# Patient Record
Sex: FEMALE | Race: WHITE
[De-identification: names, ages, dates, MRNs, and addresses within clinical notes are randomized per-mention and may not be internally consistent; named-entity substitution may affect disease eponyms.]

---

## 2018-01-24 ENCOUNTER — HOSPITAL ENCOUNTER (EMERGENCY)
Dept: HOSPITAL 25 - ED | Age: 73
Discharge: HOME | End: 2018-01-24
Payer: MEDICARE

## 2018-01-24 VITALS — DIASTOLIC BLOOD PRESSURE: 63 MMHG | SYSTOLIC BLOOD PRESSURE: 115 MMHG

## 2018-01-24 DIAGNOSIS — B34.9: Primary | ICD-10-CM

## 2018-01-24 DIAGNOSIS — C25.9: ICD-10-CM

## 2018-01-24 LAB
BASOPHILS # BLD AUTO: 0 10^3/UL (ref 0–0.2)
EOSINOPHIL # BLD AUTO: 0 10^3/UL (ref 0–0.6)
HCT VFR BLD AUTO: 24 % (ref 35–47)
HGB BLD-MCNC: 8.4 G/DL (ref 12–16)
LYMPHOCYTES # BLD AUTO: 0.5 10^3/UL (ref 1–4.8)
MCH RBC QN AUTO: 33 PG (ref 27–31)
MCHC RBC AUTO-ENTMCNC: 35 G/DL (ref 31–36)
MCV RBC AUTO: 95 FL (ref 80–97)
MONOCYTES # BLD AUTO: 0 10^3/UL (ref 0–0.8)
NEUTROPHILS # BLD AUTO: 1.3 10^3/UL (ref 1.5–7.7)
NRBC # BLD AUTO: 0 10^3/UL
NRBC BLD QL AUTO: 0
PLATELET # BLD AUTO: 101 10^3/UL (ref 150–450)
RBC # BLD AUTO: 2.52 10^6/UL (ref 4–5.4)
WBC # BLD AUTO: 1.9 10^3/UL (ref 3.5–10.8)

## 2018-01-24 PROCEDURE — 87086 URINE CULTURE/COLONY COUNT: CPT

## 2018-01-24 PROCEDURE — 87502 INFLUENZA DNA AMP PROBE: CPT

## 2018-01-24 PROCEDURE — 81015 MICROSCOPIC EXAM OF URINE: CPT

## 2018-01-24 PROCEDURE — 99284 EMERGENCY DEPT VISIT MOD MDM: CPT

## 2018-01-24 PROCEDURE — 83605 ASSAY OF LACTIC ACID: CPT

## 2018-01-24 PROCEDURE — 85025 COMPLETE CBC W/AUTO DIFF WBC: CPT

## 2018-01-24 PROCEDURE — 71045 X-RAY EXAM CHEST 1 VIEW: CPT

## 2018-01-24 PROCEDURE — 81003 URINALYSIS AUTO W/O SCOPE: CPT

## 2018-01-24 PROCEDURE — 87040 BLOOD CULTURE FOR BACTERIA: CPT

## 2018-01-24 PROCEDURE — 36415 COLL VENOUS BLD VENIPUNCTURE: CPT

## 2018-01-24 PROCEDURE — 80053 COMPREHEN METABOLIC PANEL: CPT

## 2018-01-24 NOTE — ED
Hawk CAPONE Jason, scribed for Juve Connors MD on 01/24/18 at 1903 .





HPI Febrile Illness





- HPI Summary


HPI Summary: 


This patient is a 72 year old F [place] [facility] [place]  with 

a chief complaint of fever since today. The patient states that her temperature 

arose to 100.9 degrees and since she is on chemotherapy for pancreatic cancer 

she was advised by her Oncologist on-call to go to the ED.  Patient reports 

mild HA, nasal discharge, and sinus congestion. Patient denies vomiting, 

urinary sx, cough, or sick contacts..








- History of Current Complaint


Chief Complaint: EDFever


Time Seen by Provider: 01/24/18 18:29


Hx Obtained From: Patient


Onset/Duration: Still Present


Timing: Constant


Initial Severity: Mild


Current Severity: None


Pain Intensity: 0


Pain Scale Used: 0-10 Numeric


Aggravating Factors: Nothing


Alleviating Factors: Nothing


Associated Signs and Symptoms: Negative, Other: - Patient reports mild HA, 

nasal discharge, and sinus congestion. Patient denies vomiting, and urinary sx.





- Risk Factors


Serious Bacterial Infection Risk Factors: Chemotherapy





- Additional Pertinent History


Primary Care Physician: [id number]





- Allergy/Home Medications


Allergies/Adverse Reactions: 


 Allergies











Allergy/AdvReac Type Severity Reaction Status Date / Time


 


No Known Allergies Allergy   Verified 10/25/17 10:40














PMH/Surg Hx/FS Hx/Imm Hx


Previously Healthy: No


Endocrine/Hematology History: 


   Denies: Hx Diabetes


Cardiovascular History: 


   Denies: Hx Hypertension


GI History: Reports: Other GI Disorders - Pancreatic Cancer


 History: 


   Denies: Hx Dialysis, Hx Renal Disease


Sensory History: Reports: Hx Contacts or Glasses


Opthamlomology History: Reports: Hx Contacts or Glasses





- Cancer History


Cancer Type, Location and Year: pancreatic





- Surgical History


Surgery Procedure, Year, and Place: Right ovary removed


Infectious Disease History: No


Infectious Disease History: 


   Denies: Traveled Outside the [place] in Last 30 Days





- Family History


Known Family History: Positive: Diabetes - II


   Negative: Cardiac Disease





- Social History


Occupation: Works From/At Home


Lives: With Family


Alcohol Use: None


Substance Use Type: Reports: None


Smoking Status (MU): Never Smoked Tobacco





Review of Systems


Positive: Fever - 100.9 today, Other - pancreatic cancer


Positive: Nasal Discharge, Other - sinus congestion


Negative: Vomiting


Positive: no symptoms reported


Positive: Headache - mild


All Other Systems Reviewed And Are Negative: Yes





Physical Exam





- Summary


Physical Exam Summary: 





Appearance: Well-appearing, no distress, Well-nourished


Skin: Warm, color reflects adequate perfusion


Head: Normal Head/Face inspection


Eyes: Conjunctiva clear


ENT: Normal inspection


Neck: Supple, no nodes, no JVD.


Respiratory: Lungs clear, Normal breath sounds, no respiratory distress


Cardio: RRR, No murmur, pulses normal, brisk capillary refill


Abdomen: soft, nontender, no guarding, no rebound


Bowel sounds: present 


Musculoskeletal: Strength Intact/ ROM intact. No calf tenderness. No edema.


Neuro: Alert, muscle tone normal, facial symmetry, speech normal, sensory/motor 

intact 


Psychological: Normal





Triage Information Reviewed: Yes


Vital Signs On Initial Exam: 


 Initial Vitals











Temp Pulse Resp BP Pulse Ox


 


 99 F   99   18   134/74   99 


 


 01/24/18 17:28  01/24/18 17:28  01/24/18 17:28  01/24/18 17:28  01/24/18 17:28











Vital Signs Reviewed: Yes


Appearance: Positive: Well-Appearing


Skin: Positive: Warm, Dry





Diagnostics





- Vital Signs


 Vital Signs











  Temp Pulse Resp BP Pulse Ox


 


 01/24/18 18:30   89   119/57  99


 


 01/24/18 18:00   93   118/59  99


 


 01/24/18 17:47   102    99


 


 01/24/18 17:46     119/56 


 


 01/24/18 17:28  99 F  99  18  134/74  99














- Laboratory


Lab Results: 


 Lab Results











  01/24/18 01/24/18 01/24/18 Range/Units





  19:02 19:02 19:02 


 


WBC  1.9 L    (3.5-10.8)  10^3/ul


 


RBC  2.52 L    (4.0-5.4)  10^6/ul


 


Hgb  8.4 L    (12.0-16.0)  g/dl


 


Hct  24 L    (35-47)  %


 


MCV  95    (80-97)  fL


 


MCH  33 H    (27-31)  pg


 


MCHC  35    (31-36)  g/dl


 


RDW  15    (10.5-15)  %


 


Plt Count  101 L    (150-450)  10^3/ul


 


MPV  8    (7.4-10.4)  um3


 


Neut % (Auto)  67.8    (38-83)  %


 


Lymph % (Auto)  28.1    (25-47)  %


 


Mono % (Auto)  2.6    (1-9)  %


 


Eos % (Auto)  1.1    (0-6)  %


 


Baso % (Auto)  0.4    (0-2)  %


 


Absolute Neuts (auto)  1.3 L    (1.5-7.7)  10^3/ul


 


Absolute Lymphs (auto)  0.5 L    (1.0-4.8)  10^3/ul


 


Absolute Monos (auto)  0    (0-0.8)  10^3/ul


 


Absolute Eos (auto)  0    (0-0.6)  10^3/ul


 


Absolute Basos (auto)  0    (0-0.2)  10^3/ul


 


Absolute Nucleated RBC  0    10^3/ul


 


Nucleated RBC %  0    


 


Sodium   135   (133-145)  mmol/L


 


Potassium   3.9   (3.5-5.0)  mmol/L


 


Chloride   103   (101-111)  mmol/L


 


Carbon Dioxide   26   (22-32)  mmol/L


 


Anion Gap   6   (2-11)  mmol/L


 


BUN   17   (6-24)  mg/dL


 


Creatinine   0.64   (0.51-0.95)  mg/dL


 


Est GFR ( Amer)   118.0   (>60)  


 


Est GFR (Non-Af Amer)   91.7   (>60)  


 


BUN/Creatinine Ratio   26.6 H   (8-20)  


 


Glucose   143 H   ()  mg/dL


 


Lactic Acid    0.5  (0.5-2.0)  mmol/L


 


Calcium   8.3 L   (8.6-10.3)  mg/dL


 


Total Bilirubin   1.00   (0.2-1.0)  mg/dL


 


AST   21   (13-39)  U/L


 


ALT   22   (7-52)  U/L


 


Alkaline Phosphatase   168 H   ()  U/L


 


Total Protein   5.3 L   (6.4-8.9)  g/dL


 


Albumin   3.3   (3.2-5.2)  g/dL


 


Globulin   2.0   (2-4)  g/dL


 


Albumin/Globulin Ratio   1.7   (1-3)  


 


Urine Color     


 


Urine Appearance     


 


Urine pH     (5-9)  


 


Ur Specific Gravity     (1.010-1.030)  


 


Urine Protein     (Negative)  


 


Urine Ketones     (Negative)  


 


Urine Blood     (Negative)  


 


Urine Nitrate     (Negative)  


 


Urine Bilirubin     (Negative)  


 


Urine Urobilinogen     (Negative)  


 


Ur Leukocyte Esterase     (Negative)  


 


Urine WBC (Auto)     (Absent)  


 


Urine RBC (Auto)     (Absent)  


 


Ur Squamous Epith Cells     (Absent)  


 


Urine Bacteria     (Absent)  


 


Urine Glucose     (Negative)  


 


Influenza A (Rapid)     (Negative)  


 


Influenza B (Rapid)     (Negative)  














  01/24/18 01/24/18 Range/Units





  19:18 19:50 


 


WBC    (3.5-10.8)  10^3/ul


 


RBC    (4.0-5.4)  10^6/ul


 


Hgb    (12.0-16.0)  g/dl


 


Hct    (35-47)  %


 


MCV    (80-97)  fL


 


MCH    (27-31)  pg


 


MCHC    (31-36)  g/dl


 


RDW    (10.5-15)  %


 


Plt Count    (150-450)  10^3/ul


 


MPV    (7.4-10.4)  um3


 


Neut % (Auto)    (38-83)  %


 


Lymph % (Auto)    (25-47)  %


 


Mono % (Auto)    (1-9)  %


 


Eos % (Auto)    (0-6)  %


 


Baso % (Auto)    (0-2)  %


 


Absolute Neuts (auto)    (1.5-7.7)  10^3/ul


 


Absolute Lymphs (auto)    (1.0-4.8)  10^3/ul


 


Absolute Monos (auto)    (0-0.8)  10^3/ul


 


Absolute Eos (auto)    (0-0.6)  10^3/ul


 


Absolute Basos (auto)    (0-0.2)  10^3/ul


 


Absolute Nucleated RBC    10^3/ul


 


Nucleated RBC %    


 


Sodium    (133-145)  mmol/L


 


Potassium    (3.5-5.0)  mmol/L


 


Chloride    (101-111)  mmol/L


 


Carbon Dioxide    (22-32)  mmol/L


 


Anion Gap    (2-11)  mmol/L


 


BUN    (6-24)  mg/dL


 


Creatinine    (0.51-0.95)  mg/dL


 


Est GFR ( Amer)    (>60)  


 


Est GFR (Non-Af Amer)    (>60)  


 


BUN/Creatinine Ratio    (8-20)  


 


Glucose    ()  mg/dL


 


Lactic Acid    (0.5-2.0)  mmol/L


 


Calcium    (8.6-10.3)  mg/dL


 


Total Bilirubin    (0.2-1.0)  mg/dL


 


AST    (13-39)  U/L


 


ALT    (7-52)  U/L


 


Alkaline Phosphatase    ()  U/L


 


Total Protein    (6.4-8.9)  g/dL


 


Albumin    (3.2-5.2)  g/dL


 


Globulin    (2-4)  g/dL


 


Albumin/Globulin Ratio    (1-3)  


 


Urine Color   Yellow  


 


Urine Appearance   Clear  


 


Urine pH   7.0  (5-9)  


 


Ur Specific Gravity   1.012  (1.010-1.030)  


 


Urine Protein   Negative  (Negative)  


 


Urine Ketones   Negative  (Negative)  


 


Urine Blood   Negative  (Negative)  


 


Urine Nitrate   Negative  (Negative)  


 


Urine Bilirubin   Negative  (Negative)  


 


Urine Urobilinogen   Negative  (Negative)  


 


Ur Leukocyte Esterase   2+ H  (Negative)  


 


Urine WBC (Auto)   2+(11-20/hpf) H  (Absent)  


 


Urine RBC (Auto)   Trace(0-2/hpf)  (Absent)  


 


Ur Squamous Epith Cells   Present H  (Absent)  


 


Urine Bacteria   Absent  (Absent)  


 


Urine Glucose   Negative  (Negative)  


 


Influenza A (Rapid)  Negative   (Negative)  


 


Influenza B (Rapid)  Negative   (Negative)  











Result Diagrams: 


 01/24/18 19:02





 01/24/18 19:02


Lab Statement: Any lab studies that have been ordered have been reviewed, and 

results considered in the medical decision making process.





- Radiology


  ** CXR


Radiology Interpretation Completed By: Radiologist - CXR reveals, per 

radiologist, NO ACTIVE CARDIOPULMONARY DISEASE IS NOTED. ED physician has 

reviewed this radiology report.





Course/Dx





- Course


Course Of Treatment: Pt afebrile with no antipyretics given. Pt's absolute 

neutrophil count is 1.3. CXR reveals, per radiologist, NO ACTIVE 

CARDIOPULMONARY DISEASE IS NOTED. Influenza A and B test is negative.


Assessment/Plan: We discussed patient care with Dr. Allen (oncologist) and they 

recommended the patient be discharged. Patient will be discharged and follow up 

from Dr. Arellano (oncologist). The patient is agreeable with this plan.





- Febrile Illness


Differential Diagnoses: Bacteremia, Pneumonia, Sepsis, Viremia





- Diagnoses


Provider Diagnoses: 


 Viral illness








- Provider Notifications


Discussed Care Of Patient With: Edgar Allen - Oncologist


Time Discussed With Above Provider: 21:20


Instructed by Provider To: Other - Discharge patient home if she is stable.





Discharge





- Discharge Plan


Condition: Good


Disposition: HOME


Patient Education Materials:  Viral Syndrome (ED)


Referrals: 


No Primary Care Phys,NOPCP [Primary Care Provider] - 


Kenrick Arellano MD [Medical Doctor] - 3 Days





The documentation as recorded by the Hawk mai Jason accurately 

reflects the service I personally performed and the decisions made by me, Juve Connors MD.

## 2018-01-24 NOTE — RAD
Indication: Fever.



Single frontal view of the chest performed at 1909 hours was reviewed.



Comparison is made with previous exam dated December 22, 2016.



No mediastinal shift is noted. Heart is of normal size and configuration. Lung fields

appear clear.



IMPRESSION: NO ACTIVE CARDIOPULMONARY DISEASE IS NOTED.

## 2018-02-25 ENCOUNTER — HOSPITAL ENCOUNTER (INPATIENT)
Dept: HOSPITAL 25 - ED | Age: 73
LOS: 3 days | Discharge: HOME | DRG: 809 | End: 2018-02-28
Attending: INTERNAL MEDICINE | Admitting: HOSPITALIST
Payer: MEDICARE

## 2018-02-25 DIAGNOSIS — C25.9: ICD-10-CM

## 2018-02-25 DIAGNOSIS — Z79.899: ICD-10-CM

## 2018-02-25 DIAGNOSIS — K21.9: ICD-10-CM

## 2018-02-25 DIAGNOSIS — Z80.6: ICD-10-CM

## 2018-02-25 DIAGNOSIS — C79.9: ICD-10-CM

## 2018-02-25 DIAGNOSIS — D70.9: Primary | ICD-10-CM

## 2018-02-25 DIAGNOSIS — R50.81: ICD-10-CM

## 2018-02-25 PROCEDURE — 99239 HOSP IP/OBS DSCHRG MGMT >30: CPT

## 2018-02-25 PROCEDURE — 85610 PROTHROMBIN TIME: CPT

## 2018-02-25 PROCEDURE — 86301 IMMUNOASSAY TUMOR CA 19-9: CPT

## 2018-02-25 PROCEDURE — 85025 COMPLETE CBC W/AUTO DIFF WBC: CPT

## 2018-02-25 PROCEDURE — 71045 X-RAY EXAM CHEST 1 VIEW: CPT

## 2018-02-25 PROCEDURE — 86300 IMMUNOASSAY TUMOR CA 15-3: CPT

## 2018-02-25 PROCEDURE — 81003 URINALYSIS AUTO W/O SCOPE: CPT

## 2018-02-25 PROCEDURE — 87502 INFLUENZA DNA AMP PROBE: CPT

## 2018-02-25 PROCEDURE — 85730 THROMBOPLASTIN TIME PARTIAL: CPT

## 2018-02-25 PROCEDURE — 83605 ASSAY OF LACTIC ACID: CPT

## 2018-02-25 PROCEDURE — 71260 CT THORAX DX C+: CPT

## 2018-02-25 PROCEDURE — 80053 COMPREHEN METABOLIC PANEL: CPT

## 2018-02-25 PROCEDURE — 87086 URINE CULTURE/COLONY COUNT: CPT

## 2018-02-25 PROCEDURE — 80048 BASIC METABOLIC PNL TOTAL CA: CPT

## 2018-02-25 PROCEDURE — 87040 BLOOD CULTURE FOR BACTERIA: CPT

## 2018-02-25 PROCEDURE — 36415 COLL VENOUS BLD VENIPUNCTURE: CPT

## 2018-02-25 PROCEDURE — 74177 CT ABD & PELVIS W/CONTRAST: CPT

## 2018-02-25 PROCEDURE — 99233 SBSQ HOSP IP/OBS HIGH 50: CPT

## 2018-02-26 LAB
BASOPHILS # BLD AUTO: 0 10^3/UL (ref 0–0.2)
BASOPHILS # BLD AUTO: 0 10^3/UL (ref 0–0.2)
EOSINOPHIL # BLD AUTO: 0 10^3/UL (ref 0–0.6)
EOSINOPHIL # BLD AUTO: 0 10^3/UL (ref 0–0.6)
HCT VFR BLD AUTO: 24 % (ref 35–47)
HCT VFR BLD AUTO: 27 % (ref 35–47)
HGB BLD-MCNC: 8.4 G/DL (ref 12–16)
HGB BLD-MCNC: 9.3 G/DL (ref 12–16)
INR PPP/BLD: 1.01 (ref 0.77–1.02)
LYMPHOCYTES # BLD AUTO: 0.7 10^3/UL (ref 1–4.8)
LYMPHOCYTES # BLD AUTO: 1 10^3/UL (ref 1–4.8)
MCH RBC QN AUTO: 32 PG (ref 27–31)
MCH RBC QN AUTO: 33 PG (ref 27–31)
MCHC RBC AUTO-ENTMCNC: 35 G/DL (ref 31–36)
MCHC RBC AUTO-ENTMCNC: 35 G/DL (ref 31–36)
MCV RBC AUTO: 94 FL (ref 80–97)
MCV RBC AUTO: 94 FL (ref 80–97)
MONOCYTES # BLD AUTO: 0.1 10^3/UL (ref 0–0.8)
MONOCYTES # BLD AUTO: 0.1 10^3/UL (ref 0–0.8)
NEUTROPHILS # BLD AUTO: 0.4 10^3/UL (ref 1.5–7.7)
NEUTROPHILS # BLD AUTO: 0.5 10^3/UL (ref 1.5–7.7)
NRBC # BLD AUTO: 0 10^3/UL
NRBC # BLD AUTO: 0 10^3/UL
NRBC BLD QL AUTO: 0
NRBC BLD QL AUTO: 0
PLATELET # BLD AUTO: 57 10^3/UL (ref 150–450)
PLATELET # BLD AUTO: 69 10^3/UL (ref 150–450)
RBC # BLD AUTO: 2.58 10^6/UL (ref 4–5.4)
RBC # BLD AUTO: 2.85 10^6/UL (ref 4–5.4)
WBC # BLD AUTO: 1.4 10^3/UL (ref 3.5–10.8)
WBC # BLD AUTO: 1.6 10^3/UL (ref 3.5–10.8)

## 2018-02-26 RX ADMIN — OMEPRAZOLE SCH MG: 20 CAPSULE, DELAYED RELEASE ORAL at 04:41

## 2018-02-26 RX ADMIN — CEFEPIME HYDROCHLORIDE SCH MLS/HR: 2 INJECTION, SOLUTION INTRAVENOUS at 09:52

## 2018-02-26 RX ADMIN — SODIUM CHLORIDE SCH MLS/HR: 900 IRRIGANT IRRIGATION at 09:53

## 2018-02-26 RX ADMIN — DOCUSATE SODIUM SCH MG: 100 CAPSULE, LIQUID FILLED ORAL at 09:52

## 2018-02-26 RX ADMIN — CEFEPIME HYDROCHLORIDE SCH MLS/HR: 2 INJECTION, SOLUTION INTRAVENOUS at 01:25

## 2018-02-26 RX ADMIN — SODIUM CHLORIDE SCH MLS/HR: 900 IRRIGANT IRRIGATION at 02:07

## 2018-02-26 RX ADMIN — DOCUSATE SODIUM SCH MG: 100 CAPSULE, LIQUID FILLED ORAL at 20:47

## 2018-02-26 RX ADMIN — SODIUM CHLORIDE SCH MLS/HR: 900 IRRIGANT IRRIGATION at 20:47

## 2018-02-26 RX ADMIN — CEFEPIME HYDROCHLORIDE SCH MLS/HR: 2 INJECTION, SOLUTION INTRAVENOUS at 16:22

## 2018-02-26 NOTE — RAD
INDICATION: Fever



COMPARISON: Most recent comparison chest x-ray dated January 24, 2018

 

TECHNIQUE: Single AP portable view of the chest was obtained.



FINDINGS: 



Image quality is compromised due to the relative inferiority of a portable chest x-ray.



There is a left subclavian vein Mediport with the tip terminating at the upper portion of

the superior vena cava.



The heart and mediastinum exhibit normal size and contour.



The lungs are grossly clear. There is no evidence of a large pleural effusion.



Visualized bones are normal for the patient's age.



IMPRESSION:  

1. No radiographic evidence for acute cardiopulmonary abnormality on this portable chest

x-ray. 

2. The left subclavian vein Mediport terminates at the high superior vena cava. Please

correlate to ability to aspirate and inject the Mediport clinically.

## 2018-02-26 NOTE — ED
Jodie CAPONE Edward, scribed for Xena Aly MD on 02/25/18 at 2225 .





HPI Febrile Illness





- HPI Summary


HPI Summary: 





72 y/o female presents to the ED c/o fever at around 16:00 today, high of 101.5 

at around 17:30 this afternoon. Denies nausea. Associated sx: one side of 

nostril "plugged" but pt states she gets blood clots often, sinus pressure. 

PMHx pancreatic CA dx May 2016, currently on chemo. Pt sent to ED by Dr. Arellano. 

Sx stent put in, ovarian removal.





- History of Current Complaint


Chief Complaint: EDFever


Time Seen by Provider: 02/25/18 22:23


Hx Obtained From: Patient


Onset/Duration: Started Hours Ago


Timing: Constant


Pain Intensity: 0


Aggravating Factors: Nothing


Alleviating Factors: Nothing


Associated Signs and Symptoms: Other: - sinus pressure





- Additional Pertinent History


Primary Care Physician: [id number]





- Allergy/Home Medications


Allergies/Adverse Reactions: 


 Allergies











Allergy/AdvReac Type Severity Reaction Status Date / Time


 


No Known Allergies Allergy   Verified 10/25/17 10:40














PMH/Surg Hx/FS Hx/Imm Hx


Previously Healthy: No


Endocrine/Hematology History: 


   Denies: Hx Diabetes


Cardiovascular History: 


   Denies: Hx Hypertension


GI History: Reports: Other GI Disorders - Pancreatic Cancer


 History: 


   Denies: Hx Dialysis, Hx Renal Disease


Sensory History: Reports: Hx Contacts or Glasses


Opthamlomology History: Reports: Hx Contacts or Glasses





- Cancer History


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





- Surgical History


Surgery Procedure, Year, and Place: Right ovary removed


Infectious Disease History: No


Infectious Disease History: 


   Denies: Traveled Outside the [place] in Last 30 Days





- Family History


Known Family History: Positive: Diabetes - II


   Negative: Cardiac Disease





- Social History


Alcohol Use: None


Hx Substance Use: No


Substance Use Type: Reports: None


Hx Tobacco Use: No


Smoking Status (MU): Never Smoked Tobacco





Review of Systems


Positive: Fever


Eyes: Negative


ENT: Other - sinus pressure


Cardiovascular: Negative


Respiratory: Negative


Gastrointestinal: Negative


Genitourinary: Negative


Musculoskeletal: Negative


Skin: Negative


Neurological: Negative


Psychological: Normal


All Other Systems Reviewed And Are Negative: Yes





Physical Exam





- Summary


Physical Exam Summary: 





VITAL SIGNS: Reviewed.


GENERAL:  Patient is a well-developed and nourished female who is lying 

comfortable in the stretcher. Patient is not in any acute respiratory distress.


HEAD AND FACE: No signs of trauma. No ecchymosis, hematomas or skull 

depressions. No sinus tenderness.


EYES: PERRLA, EOMI x 2, No injected conjunctiva, no nystagmus.


EARS: Hearing grossly intact. Ear canals and tympanic membranes are within 

normal limits.


MOUTH: Oropharynx within normal limits.


NECK: Supple, trachea is midline, no adenopathy, no JVD, no carotid bruit, no c-

spine tenderness, neck with full ROM.


CHEST: Symmetric, no tenderness at palpation


LUNGS: Clear to auscultation bilaterally. No wheezing or crackles.


CVS: Tachycardic, S1 and S2 present, no murmurs or gallops appreciated.


ABDOMEN: Soft, non-tender. No signs of distention. No rebound no guarding, and 

no masses palpated. Bowel sounds are normal.


EXTREMITIES: FROM in all major joints, no edema, no cyanosis or clubbing.


NEURO: Alert and oriented x 3. No acute neurological deficits. Speech is normal 

and follows commands.


SKIN: Dry and warm


Triage Information Reviewed: Yes


Vital Signs On Initial Exam: 


 Initial Vitals











Temp Pulse Resp BP Pulse Ox


 


 99.5 F   131   16   115/76   98 


 


 02/25/18 21:17  02/25/18 21:17  02/25/18 21:17  02/25/18 21:17  02/25/18 21:17











Vital Signs Reviewed: Yes





Diagnostics





- Vital Signs


 Vital Signs











  Temp Pulse Resp BP Pulse Ox


 


 02/25/18 22:24  99.3 F    


 


 02/25/18 21:17  99.5 F  131  16  115/76  98














- Laboratory


Result Diagrams: 


 02/25/18 23:42





 02/25/18 23:23


Lab Statement: Any lab studies that have been ordered have been reviewed, and 

results considered in the medical decision making process.





- Radiology


  ** CXR 


Xray Interpretation: No Acute Changes


Radiology Interpretation Completed By: ED Physician





Re-Evaluation





- Re-Evaluation


  ** 1


Re-Evaluation Time: 00:44


Comment: Discuss test results, plan of care





Course/Dx





- Course


Assessment/Plan: WBC 1.4 L, Absolute Neutrophils 0.5 L. INFLUENZA A AND B 

NEGATIVE. CXR negative. Spoke with Dr. Frankenberg who will admit the pt to 

[facility]. Pt agrees with plan.





- Diagnoses


Provider Diagnoses: 


 Neutropenic fever








- Provider Notifications


Discussed Care Of Patient With: Fred Frankenberg


Time Discussed With Above Provider: 00:45


Instructed by Provider To: Admit As Inpatient





Discharge





- Discharge Plan


Condition: Stable


Disposition: ADMITTED TO [facility]


Referrals: 


No Primary Care Phys,NOPCP [Primary Care Provider] - 





The documentation as recorded by the Jodie mai Edward accurately reflects the 

service I personally performed and the decisions made by me, Xena Aly MD.

## 2018-02-26 NOTE — PN
Progress Note





- Progress Note


Date of Service: 02/26/18


SOAP: 


Subjective:


[]She feels fine. No abdominal pain. Fever last night better. Has been 

tolerating therapy but with frequent does delays for low counts. No focal 

symptoms of infection. 











Acetaminophen (Tylenol Tab*)  650 mg PO Q6H PRN


   PRN Reason: FEVER/PAIN


Docusate Sodium (Colace Cap*)  200 mg PO BID [facility]


   Last Admin: 02/26/18 09:52 Dose:  200 mg


Heparin Sodium (Porcine) (Heparin Vial(*))  5,000 units SUBCUT Q8HR [facility]


Cefepime HCl (Maxipime 2 Gm In Dextrose Duplex (*))  2 gm in 50 mls @ 100 mls/

hr IV Q8H [facility]


   Last Admin: 02/26/18 09:52 Dose:  100 mls/hr


Sodium Chloride (Ns 0.9% 1000 Ml*)  1,000 mls @ 125 mls/hr IV PER RATE [facility]


   Last Admin: 02/26/18 09:53 Dose:  125 mls/hr


Melatonin (Melatonin (Nf))  3 mg PO BEDTIME PRN; Protocol


   PRN Reason: Sleep


Omeprazole (Prilosec Cap*)  20 mg PO DAILY@0600 [facility]


   Last Admin: 02/26/18 04:41 Dose:  20 mg


Ondansetron HCl (Zofran Inj*)  4 mg IV Q6H PRN


   PRN Reason: NAUSEA


Tramadol HCl (Ultram*)  50 mg PO Q6H PRN


   PRN Reason: PAIN





Objective:


[]


 Vital Signs











Temp Pulse Resp BP Pulse Ox


 


 98.6 F   103   18   111/64   98 


 


 02/26/18 11:01  02/26/18 11:01  02/26/18 11:01  02/26/18 11:01  02/26/18 11:01








HEENT pale, no moral lesions, no dental disease


CTA


RRR S1S2


+BS, NT, ND and no masses


Ext - Tr edema


no skin lesions





CXR and UA negative


Flu negative


BC not reported yet








Assessment:


[]73 year old on second line therapy with Abraxane/Gemcitabine for metastatic 

pancreatic cancer. She has had frequent dose delays for cytopenias and no NF. 

Had been due today.








Plan:


[]1. NF. Continue Cefepime and follow counts. Neupogen if not improving 

tomorrow.


2. Re-check CT scan and CA 27-29. If cancer stable or responding will continue 

chemotherapy on once every other week.

## 2018-02-26 NOTE — HP
H&P (Free Text)


History and Physical: 





PCP: BELEM Arellano MD





Date/Time: 2018 0045





CC: fever





HPI: Mrs Lennox is a 74YO female HX of pancreatic CA on treatment with 

gemcitabine & abraxane last give 2018. She awoke  AM with 

increased fatigue which persisted throughout the day, developing a fever of 

101F around 1600 for which she called her oncologist who advised evaluation in 

the ED. She has some mild head congestion, but denies exacerbating or 

alleviating factors, chills, sweats, N/V, diarrhea, cough, chest congestion, 

headache, sore throat, earache, abdominal pain, B/U/F of urine, flank pain, 

chest pain, rash, open wound, or other issues. Her  who is a physician's 

assistant heard a slight wheeze in her lung this AM which had resolved by 

afternoon. ED evaluation reveals neutropenia with an ANC of 0.5. She was given 

IV vancomycin in the ED and was ordered piperacillin/tazobactam which I D/C'd 

in preference of cefepime 2gm IV Q8H. She will be pan-cultured and placed on 

neutropenic precautions.





PMedHx


pancreatic CA on treatment with gemcitabine & abraxane last give 2018


GERD





Ambulatory Orders





Metoclopramide TAB* [Reglan TAB*] 10 mg PO Q6H PRN 16 


Omeprazole CAP* [Prilosec CAP* 20 MG] 20 mg PO DAILY 16 


Levofloxacin TAB* [Levaquin TAB*] 750 mg PO DAILY #10 tab 16 


Loperamide CAP* [Imodium CAP*] 2 mg PO .SEE DIRECTIONS PRN #0 cap 16 


Potassium Chlor TAB* [Potassium Chlor TAB 20 MEQ*] 40 meq PO DAILY #60 tab.er  


Magnesium Oxide TAB* [MagOx 400 TAB*] 400 mg PO BID 18 





Allergies


No Known Allergies Allergy (Verified 10/25/17 10:40)





PSurgHx


R oophorectomy


port placement


biliary stent placement





SocHx: no tobacco, alcohol, or recreational drugs; volunteer with [employer]; , lives with her ; full code status





FamHx: Mother:  age 88 2nd leukemia; Father passed at 75 of unknown 

cause.





ROS: as above, otherwise reviewed and all were negative





vitals: 


 Vital Signs











Temp  36.8 C   18 02:24


 


Pulse  92   18 02:24


 


Resp  16   18 02:26


 


BP  121/78   18 02:24


 


Pulse Ox  100   18 02:24








 Intake & Output











 18





 11:59 23:59 11:59


 


Intake Total  1000 0


 


Balance  1000 0


 


Weight  62.142 kg 61.87 kg


 


Intake:   


 


  IV Fluids  1000 


 


  Oral   0








Constitutional: NAD, normally developed, well-nourished elderly white female


HEENM: atraumatic; sclera/conjunctiva: anicteric/clear; hearing: clinically 

intact; oropharynx: clear, mucosa moist


Neck: soft tissue: no nuchal rigidity; thyroid: normal


Pulmonary: clear to auscultation bilaterally, good aeration, no accessory 

muscle use 


CV: RR/RR, normal S1S2, no carotid bruit, no jugular venous distention, 2+ B DP/

PT, no edema


Abdominal: soft, non-distended, non-tender, no rebound/guarding/rigidity, 

normoactive bowel sounds, no hepatosplenomegaly or masses, no costovertebral 

angle tenderness


Musculoskeletal: general: grossly intact, no tenderness to palpation


Integumental: normal appearance and texture of exposed skin





Psychiatric 


orientation: AA&O to PPS


affect: calm


mood: cooperative/pleasant


eye contact: good


content: reliable


responses: timely


insight: good





Testing: 


 Lab Results











  18 Range/Units





  23:23 23:23 23:42 


 


WBC    1.4 L  (3.5-10.8)  10^3/ul


 


RBC    2.85 L  (4.0-5.4)  10^6/ul


 


Hgb    9.3 L  (12.0-16.0)  g/dl


 


Hct    27 L  (35-47)  %


 


MCV    94  (80-97)  fL


 


MCH    33 H  (27-31)  pg


 


MCHC    35  (31-36)  g/dl


 


RDW    14  (10.5-15)  %


 


Plt Count    69 L  (150-450)  10^3/ul


 


MPV    8  (7.4-10.4)  um3


 


Neut % (Auto)    36.2 L  (38-83)  %


 


Lymph % (Auto)    54.9 H  (25-47)  %


 


Mono % (Auto)    6.3  (0-7)  %


 


Eos % (Auto)    1.3  (0-6)  %


 


Baso % (Auto)    1.3  (0-2)  %


 


Absolute Neuts (auto)    0.5 L*  (1.5-7.7)  10^3/ul


 


Absolute Lymphs (auto)    0.7 L  (1.0-4.8)  10^3/ul


 


Absolute Monos (auto)    0.1  (0-0.8)  10^3/ul


 


Absolute Eos (auto)    0  (0-0.6)  10^3/ul


 


Absolute Basos (auto)    0  (0-0.2)  10^3/ul


 


Absolute Nucleated RBC    0  10^3/ul


 


Nucleated RBC %    0  


 


INR (Anticoag Therapy)  1.01    (0.77-1.02)  


 


APTT  36.2    (26.0-36.3)  seconds


 


Sodium   130 L   (133-145)  mmol/L


 


Potassium   3.9   (3.5-5.0)  mmol/L


 


Chloride   99 L   (101-111)  mmol/L


 


Carbon Dioxide   25   (22-32)  mmol/L


 


Anion Gap   6   (2-11)  mmol/L


 


BUN   26 H   (6-24)  mg/dL


 


Creatinine   0.83   (0.51-0.95)  mg/dL


 


Est GFR ( Amer)   87.2   (>60)  


 


Est GFR (Non-Af Amer)   67.8   (>60)  


 


BUN/Creatinine Ratio   31.3 H   (8-20)  


 


Glucose   192 H   ()  mg/dL


 


Lactic Acid     (0.5-2.0)  mmol/L


 


Calcium   9.3   (8.6-10.3)  mg/dL


 


Total Bilirubin   0.90   (0.2-1.0)  mg/dL


 


AST   21   (13-39)  U/L


 


ALT   24   (7-52)  U/L


 


Alkaline Phosphatase   185 H   ()  U/L


 


Total Protein   6.1 L   (6.4-8.9)  g/dL


 


Albumin   3.7   (3.2-5.2)  g/dL


 


Globulin   2.4   (2-4)  g/dL


 


Albumin/Globulin Ratio   1.5   (1-3)  


 


Urine Color     


 


Urine Appearance     


 


Urine pH     (5-9)  


 


Ur Specific Gravity     (1.010-1.030)  


 


Urine Protein     (Negative)  


 


Urine Ketones     (Negative)  


 


Urine Blood     (Negative)  


 


Urine Nitrate     (Negative)  


 


Urine Bilirubin     (Negative)  


 


Urine Urobilinogen     (Negative)  


 


Ur Leukocyte Esterase     (Negative)  


 


Urine Glucose     (Negative)  


 


Urine Ascorbic Acid     (Negative)  


 


Influenza A (Rapid)     (Negative)  


 


Influenza B (Rapid)     (Negative)  














  18 Range/Units





  23:42 23:53 00:43 


 


WBC     (3.5-10.8)  10^3/ul


 


RBC     (4.0-5.4)  10^6/ul


 


Hgb     (12.0-16.0)  g/dl


 


Hct     (35-47)  %


 


MCV     (80-97)  fL


 


MCH     (27-31)  pg


 


MCHC     (31-36)  g/dl


 


RDW     (10.5-15)  %


 


Plt Count     (150-450)  10^3/ul


 


MPV     (7.4-10.4)  um3


 


Neut % (Auto)     (38-83)  %


 


Lymph % (Auto)     (25-47)  %


 


Mono % (Auto)     (0-7)  %


 


Eos % (Auto)     (0-6)  %


 


Baso % (Auto)     (0-2)  %


 


Absolute Neuts (auto)     (1.5-7.7)  10^3/ul


 


Absolute Lymphs (auto)     (1.0-4.8)  10^3/ul


 


Absolute Monos (auto)     (0-0.8)  10^3/ul


 


Absolute Eos (auto)     (0-0.6)  10^3/ul


 


Absolute Basos (auto)     (0-0.2)  10^3/ul


 


Absolute Nucleated RBC     10^3/ul


 


Nucleated RBC %     


 


INR (Anticoag Therapy)     (0.77-1.02)  


 


APTT     (26.0-36.3)  seconds


 


Sodium     (133-145)  mmol/L


 


Potassium     (3.5-5.0)  mmol/L


 


Chloride     (101-111)  mmol/L


 


Carbon Dioxide     (22-32)  mmol/L


 


Anion Gap     (2-11)  mmol/L


 


BUN     (6-24)  mg/dL


 


Creatinine     (0.51-0.95)  mg/dL


 


Est GFR ( Amer)     (>60)  


 


Est GFR (Non-Af Amer)     (>60)  


 


BUN/Creatinine Ratio     (8-20)  


 


Glucose     ()  mg/dL


 


Lactic Acid  0.8    (0.5-2.0)  mmol/L


 


Calcium     (8.6-10.3)  mg/dL


 


Total Bilirubin     (0.2-1.0)  mg/dL


 


AST     (13-39)  U/L


 


ALT     (7-52)  U/L


 


Alkaline Phosphatase     ()  U/L


 


Total Protein     (6.4-8.9)  g/dL


 


Albumin     (3.2-5.2)  g/dL


 


Globulin     (2-4)  g/dL


 


Albumin/Globulin Ratio     (1-3)  


 


Urine Color    Yellow  


 


Urine Appearance    Clear  


 


Urine pH    5.0  (5-9)  


 


Ur Specific Gravity    1.020  (1.010-1.030)  


 


Urine Protein    Negative  (Negative)  


 


Urine Ketones    Negative  (Negative)  


 


Urine Blood    Negative  (Negative)  


 


Urine Nitrate    Negative  (Negative)  


 


Urine Bilirubin    Negative  (Negative)  


 


Urine Urobilinogen    Negative  (Negative)  


 


Ur Leukocyte Esterase    Negative  (Negative)  


 


Urine Glucose    Negative  (Negative)  


 


Urine Ascorbic Acid    * H  (Negative)  


 


Influenza A (Rapid)   Negative   (Negative)  


 


Influenza B (Rapid)   Negative   (Negative)  








CXR, personally reviewed: no acute process, port L chest





Impression: 73F HX pancreatic CA  on treatment with gemcitabine & abraxane last 

give 2018 presents with neutropenic fever





DIAGNOSIS & PLAN


Primary 


neutropenic fever


: blood, sputum, & urine CXs


: IVFs


: cefepime 2gm IV Q8H


: neutropenic precautions


: supportive care





Secondary 


pancreatic CA


: continue management per oncology





Admission Rational: inpatient for neutropenic fever at very high risk of 

morbidity/mortality; inappropriate for outpatient setting


DVTp: SCDs & heparin SQ


Code Status: full


HCP:

## 2018-02-26 NOTE — RAD
INDICATION: Pancreatic and ovarian cancer. Neutropenic fever.



COMPARISON: December 06, 2017 PET/CT. February 25, 2018 chest radiograph. October 25, 2017

CT.



TECHNIQUE: Multidetector CT images were obtained from the lung apices to the ischial

tuberosities with 83 mL Omnipaque 300 IV contrast.  Oral contrast administered.



CHEST REPORT: Minimal LEFT greater than RIGHT basilar atelectasis. No alveolar

consolidation concerning for pneumonia or suspicious focal pulmonary lesions. Negative for

pleural effusions.



Negative for thoracic lymphadenopathy. Tip of LEFT chest port at level of superior vena

cava. Negative for cardiomegaly. Physiologic small volume of pericardial fluid. Normal

diameter thoracic aorta.



Mild anterior compression deformity of the T10 vertebral body is chronic. Multiple osseous

hemangiomas of the thoracic spine noted. No suspicious osseous lesions of the thorax

evident.



CHEST IMPRESSION: 

1. No pulmonary inflammatory process evident.

2. No evidence for thoracic metastatic disease.



ABDOMEN PELVIS REPORT: 2.5 cm AP by 3.9 cm transverse hypodense mass at the pancreatic

body increased from 2.0 x 3.8 cm previously. Associated advanced atrophy of the pancreatic

tail. Metallic biliary stent in place with associated pneumobilia. No conspicuous focal

liver lesions evident. Mildly increased upper normal size spleen. Chronic finding

portosystemic shunts extending to the LEFT renal vein. Normal opacification of the

superior mesenteric, splenic, and portal veins.



No CT abnormality of the upper GI, small bowel, appendix visualized medial to the cecum,

or colon evident. Negative for ascites, free air, hernias.



Normal adrenal glands. Symmetric nephrograms and pyelograms. No suspicious renal lesions

or hydronephrosis. Unremarkable nondilated ureters and partially distended urinary

bladder. Partially calcified fibroid at the LEFT uterine fundus. Unremarkable adnexal

regions.



Normal diameter abdominal aorta and iliac arteries with mild atherosclerotic plaque.

Physiologic partial distention of the IVC.



Negative for suspicious osseous lesions.



ABDOMEN PELVIS IMPRESSION: 

1. Mild interval enlargement of hypodense mass at the pancreatic body. Mild increase in

intrahepatic biliary dilatation. Metallic biliary stent remains in place.

2. Chronic finding portosystemic shunts extending to the LEFT renal vein. Normal

opacification of the superior mesenteric, splenic, and portal veins.

3. Negative for ascites.

4. Negative for lymphadenopathy.

## 2018-02-27 LAB
BASOPHILS # BLD AUTO: 0 10^3/UL (ref 0–0.2)
EOSINOPHIL # BLD AUTO: 0.1 10^3/UL (ref 0–0.6)
HCT VFR BLD AUTO: 21 % (ref 35–47)
HGB BLD-MCNC: 7.5 G/DL (ref 12–16)
LYMPHOCYTES # BLD AUTO: 0.9 10^3/UL (ref 1–4.8)
MCH RBC QN AUTO: 33 PG (ref 27–31)
MCHC RBC AUTO-ENTMCNC: 36 G/DL (ref 31–36)
MCV RBC AUTO: 93 FL (ref 80–97)
MONOCYTES # BLD AUTO: 0.2 10^3/UL (ref 0–0.8)
NEUTROPHILS # BLD AUTO: 0.4 10^3/UL (ref 1.5–7.7)
NRBC # BLD AUTO: 0 10^3/UL
NRBC BLD QL AUTO: 0.2
PLATELET # BLD AUTO: 50 10^3/UL (ref 150–450)
RBC # BLD AUTO: 2.25 10^6/UL (ref 4–5.4)
WBC # BLD AUTO: 1.5 10^3/UL (ref 3.5–10.8)

## 2018-02-27 RX ADMIN — CEFEPIME HYDROCHLORIDE SCH MLS/HR: 2 INJECTION, SOLUTION INTRAVENOUS at 01:08

## 2018-02-27 RX ADMIN — SPIRONOLACTONE SCH MG: 25 TABLET ORAL at 11:37

## 2018-02-27 RX ADMIN — SPIRONOLACTONE SCH MG: 25 TABLET ORAL at 20:01

## 2018-02-27 RX ADMIN — OMEPRAZOLE SCH MG: 20 CAPSULE, DELAYED RELEASE ORAL at 05:48

## 2018-02-27 RX ADMIN — DOCUSATE SODIUM SCH: 100 CAPSULE, LIQUID FILLED ORAL at 10:28

## 2018-02-27 RX ADMIN — CEFEPIME HYDROCHLORIDE SCH MLS/HR: 2 INJECTION, POWDER, FOR SOLUTION INTRAVENOUS at 16:05

## 2018-02-27 RX ADMIN — CEFEPIME HYDROCHLORIDE SCH MLS/HR: 2 INJECTION, SOLUTION INTRAVENOUS at 09:22

## 2018-02-28 VITALS — SYSTOLIC BLOOD PRESSURE: 106 MMHG | DIASTOLIC BLOOD PRESSURE: 54 MMHG

## 2018-02-28 LAB
BASOPHILS # BLD AUTO: 0 10^3/UL (ref 0–0.2)
EOSINOPHIL # BLD AUTO: 0.2 10^3/UL (ref 0–0.6)
HCT VFR BLD AUTO: 23 % (ref 35–47)
HGB BLD-MCNC: 7.9 G/DL (ref 12–16)
LYMPHOCYTES # BLD AUTO: 1.2 10^3/UL (ref 1–4.8)
MCH RBC QN AUTO: 33 PG (ref 27–31)
MCHC RBC AUTO-ENTMCNC: 35 G/DL (ref 31–36)
MCV RBC AUTO: 94 FL (ref 80–97)
MONOCYTES # BLD AUTO: 0.4 10^3/UL (ref 0–0.8)
NEUTROPHILS # BLD AUTO: 2 10^3/UL (ref 1.5–7.7)
NRBC # BLD AUTO: 0 10^3/UL
NRBC BLD QL AUTO: 0.1
PLATELET # BLD AUTO: 69 10^3/UL (ref 150–450)
RBC # BLD AUTO: 2.43 10^6/UL (ref 4–5.4)
WBC # BLD AUTO: 3.7 10^3/UL (ref 3.5–10.8)

## 2018-02-28 RX ADMIN — CEFEPIME HYDROCHLORIDE SCH: 2 INJECTION, POWDER, FOR SOLUTION INTRAVENOUS at 08:53

## 2018-02-28 RX ADMIN — SPIRONOLACTONE SCH: 25 TABLET ORAL at 09:15

## 2018-02-28 RX ADMIN — OMEPRAZOLE SCH MG: 20 CAPSULE, DELAYED RELEASE ORAL at 06:14

## 2018-02-28 RX ADMIN — CEFEPIME HYDROCHLORIDE SCH MLS/HR: 2 INJECTION, POWDER, FOR SOLUTION INTRAVENOUS at 00:33

## 2018-02-28 NOTE — DS
- Discharge Summary





ADMIT DATE: 2/25/18


DISCHARGE DATE: 2/28/18





DISCHARGE DIAGNOSIS:


1. neutropenic fever


2. metastatic pancreatic cancer





DISCHARGE MEDICATIONS:


 Home Medications











 Medication  Instructions  Recorded  Confirmed  Type


 


Metoclopramide TAB* [Reglan TAB*] 10 mg PO Q6H PRN 12/18/16 02/26/18 History


 


Omeprazole CAP* [Prilosec CAP* 20 20 mg PO DAILY 12/18/16 02/26/18 History





MG]    


 


Loperamide CAP* [Imodium CAP*] 2 mg PO .SEE DIRECTIONS PRN #0 cap 12/22/16 02/26 /18 Rx


 


Magnesium Oxide TAB* [MagOx 400 400 mg PO BID 02/26/18 02/26/18 History





TAB*]    


 


Spironolactone TAB* [Aldactone TAB 25 mg PO BID #60 tab 02/28/18  Rx





25 MG*]    








DISCHARGE FOLLOW UP:


WE WILL CALL YOU WITH APPOINTMENT





HOSPITAL COURSE:


Please see full admit H+P, briefly 72 yo F w metastatic pancreatic cancer on 

palliative gemcitabine/abraxane, with frequent delays for counts, presenting 

with neutropenic fevers.  She was treated with vanco/cefepime, and all cultures 

are negative to date.  She is no longer neutropenic with the addition of 

neupogen and will get one more dose today prior to discharge.  She will likely 

have chemotherapy on Monday but we will call her to confirm.  Her CT scan 

showed essentially stable disease and her CA 19-9 is pending.





>30 mins spent, >50% in face to face counseling

## 2018-03-22 ENCOUNTER — HOSPITAL ENCOUNTER (OUTPATIENT)
Dept: HOSPITAL 25 - OR | Age: 73
Discharge: HOME | End: 2018-03-22
Attending: SURGERY
Payer: MEDICARE

## 2018-03-22 VITALS — SYSTOLIC BLOOD PRESSURE: 115 MMHG | DIASTOLIC BLOOD PRESSURE: 71 MMHG

## 2018-03-22 DIAGNOSIS — C25.0: Primary | ICD-10-CM

## 2018-03-22 DIAGNOSIS — D20.1: ICD-10-CM

## 2018-03-22 DIAGNOSIS — Z79.899: ICD-10-CM

## 2018-03-22 PROCEDURE — 88305 TISSUE EXAM BY PATHOLOGIST: CPT

## 2018-03-22 PROCEDURE — 88307 TISSUE EXAM BY PATHOLOGIST: CPT

## 2018-03-22 PROCEDURE — 88331 PATH CONSLTJ SURG 1 BLK 1SPC: CPT

## 2018-03-23 ENCOUNTER — HOSPITAL ENCOUNTER (INPATIENT)
Dept: HOSPITAL 25 - ED | Age: 73
LOS: 3 days | Discharge: HOME | DRG: 908 | End: 2018-03-26
Attending: SURGERY | Admitting: SURGERY
Payer: MEDICARE

## 2018-03-23 DIAGNOSIS — Z79.899: ICD-10-CM

## 2018-03-23 DIAGNOSIS — C25.9: ICD-10-CM

## 2018-03-23 DIAGNOSIS — N99.72: Primary | ICD-10-CM

## 2018-03-23 DIAGNOSIS — Z83.3: ICD-10-CM

## 2018-03-23 LAB
BASOPHILS # BLD AUTO: 0.1 10^3/UL (ref 0–0.2)
EOSINOPHIL # BLD AUTO: 0 10^3/UL (ref 0–0.6)
HCT VFR BLD AUTO: 29 % (ref 35–47)
HGB BLD-MCNC: 9.6 G/DL (ref 12–16)
LYMPHOCYTES # BLD AUTO: 1.4 10^3/UL (ref 1–4.8)
MCH RBC QN AUTO: 31 PG (ref 27–31)
MCHC RBC AUTO-ENTMCNC: 34 G/DL (ref 31–36)
MCV RBC AUTO: 92 FL (ref 80–97)
MONOCYTES # BLD AUTO: 1 10^3/UL (ref 0–0.8)
NEUTROPHILS # BLD AUTO: 23.1 10^3/UL (ref 1.5–7.7)
NRBC # BLD AUTO: 0 10^3/UL
NRBC BLD QL AUTO: 0.1
PLATELET # BLD AUTO: 310 10^3/UL (ref 150–450)
RBC # BLD AUTO: 3.1 10^6/UL (ref 4–5.4)
WBC # BLD AUTO: 25.6 10^3/UL (ref 3.5–10.8)

## 2018-03-23 PROCEDURE — 83690 ASSAY OF LIPASE: CPT

## 2018-03-23 PROCEDURE — 80053 COMPREHEN METABOLIC PANEL: CPT

## 2018-03-23 PROCEDURE — G0378 HOSPITAL OBSERVATION PER HR: HCPCS

## 2018-03-23 PROCEDURE — 83605 ASSAY OF LACTIC ACID: CPT

## 2018-03-23 PROCEDURE — 36415 COLL VENOUS BLD VENIPUNCTURE: CPT

## 2018-03-23 PROCEDURE — 87040 BLOOD CULTURE FOR BACTERIA: CPT

## 2018-03-23 PROCEDURE — 80048 BASIC METABOLIC PNL TOTAL CA: CPT

## 2018-03-23 PROCEDURE — 0TQB4ZZ REPAIR BLADDER, PERCUTANEOUS ENDOSCOPIC APPROACH: ICD-10-PCS | Performed by: SURGERY

## 2018-03-23 PROCEDURE — 85025 COMPLETE CBC W/AUTO DIFF WBC: CPT

## 2018-03-23 PROCEDURE — 85027 COMPLETE CBC AUTOMATED: CPT

## 2018-03-23 PROCEDURE — 88305 TISSUE EXAM BY PATHOLOGIST: CPT

## 2018-03-23 PROCEDURE — 99284 EMERGENCY DEPT VISIT MOD MDM: CPT

## 2018-03-23 PROCEDURE — 84484 ASSAY OF TROPONIN QUANT: CPT

## 2018-03-23 PROCEDURE — 88307 TISSUE EXAM BY PATHOLOGIST: CPT

## 2018-03-23 PROCEDURE — 88331 PATH CONSLTJ SURG 1 BLK 1SPC: CPT

## 2018-03-23 NOTE — XMS REPORT
Regina Lennox

 Created on:2018



Patient:Lennox, Regina

Sex:Female

:1945

External Reference #:[id number]





Demographics







 Address  [address] [place] [address]

 

 Home Phone  [phone]

 

 Mobile Phone  [phone]

 

 Work Phone  [phone]

 

 Email Address  [email or web address]

 

 Preferred Language  English

 

 Marital Status  Not  Or 

 

 Shinto Affiliation  Unknown

 

 Race  White

 

 Ethnic Group  Not  Or 









Author







 Organization  [employer]

 

 Address  [address] [address]

 

 Phone  [phone]









Care Team Providers







 Name  Role  Phone

 

 Kenrick Arellano MD  Primary Care Physician  Unavailable









Payers







 Type  Date  Identification Numbers  Payment Provider  Subscriber

 

 Medicare Primary    Policy Number: [id number]  Medicare  Goldie Lennox









 PayID: [id number]  [address]









 [place] [address]









 [facility]    Policy Number: [id number]  [employer]  Regina Lennox









 PayID: [id number]  [address]









 [place] [address]







Problems







 Description

 

 No Information







Social History







 Type  Date  Description  Comments

 

 Smoking    Patient has never smoked  







Allergies, Adverse Reactions, Alerts







 Date  Description  Reaction  Status  Severity  Comments

 

 2018  NKDA    active    







Medications







 Medication  Date  Status  Form  Strength  Qnty  SIG  Indications  Ordering



                 Provider

 

 Aldactone    Active  Tablets  25mg    1 by mouth    Unknown



   /0000          every day as    



             needed    

 

 Bisacodyl Ec    Active  Tablets  5mg    take one    Unknown



   /0000    DR      tablet by    



             mouth once    



             daily for    



             constipation    



             as needed    

 

 Colace    Active  Capsules  100mg    1 tab by    Unknown



   /0000          mouth 2-3    



             times a day    



             as needed    

 

 Senna    Active  Tablets  8.6mg    2 tabs by    Unknown



   /0000          mouth 1-2    



             times daily    



             as needed    

 

 Magnesium    Active  Tablets  400mg    1 by mouth    Unknown



   /0000          twice a  day    

 

 Multivitamin    Active  Tablets      1 by mouth    Unknown



 Adult  /0000          every day    

 

 Miralax    Active  Packet  3350NF    once a day    Unknown



   /0000          in in the    



             morning as    



             needed for    



             constipation    

 

 Omeprazole    Active  Capsules  20mg    1 by mouth    Unknown



   /0000    DR      every day as    



             needed    

 

 Hydromorphone HCL    Active  Tablets  4mg    take   1/2    Unknown



   /0000          To 1 Tablet    



             by mouth    



             every 4    



             hours if    



             needed    

 

 Spironolactone/Hy    Active  Tablets  25-25mg    1 by mouth    Unknown



 drochlorothiazide  /0000          every day    

 

 Tylenol    Active  Capsules  325mg    2 tablets    Unknown



   /0000          every 4    



             hours as    



             needed for    



             pain    

 

                 

 

 Imodium A-D    Hx  Capsules  2mg    as directed    Unknown



   /0000              

 

 Ondansetron    Hx  Tablets  4mg    dissolve one    Unknown



   /0000    Dispers      tablet    



             orally every    



             6 hours as    



             needed for    



             nausea.    

 

 Prochlorperazine    Hx  Tablets  5mg    1 by mouth    Unknown



 Maleate  /0000          every 8 h as    



             needed    



             nausea    







Vital Signs







 Date  Vital  Result  Comment

 

 2018  Height  62.5 inches  5'2.50"









 Weight  138.00 lb  

 

 Heart Rate  80 /min  

 

 BP Systolic  102 mmHg  

 

 BP Diastolic  64 mmHg  

 

 Respiratory Rate  16 /min  

 

 Body Temperature  97.5 F  

 

 BMI (Body Mass Index)  24.8 kg/m2  







Results







 Description

 

 No Information







Procedures







 Date  CPT Code  Description  Status

 

 10/20/2009  35691  Biopsy Cervix, Single Or Multiple, Or Local Excision Of  
Completed



     Lesion  

 

 2009  54523  EKG Tracing &amp; Interpretation  Completed







Encounters







 Type  Date  Location  Provider  CPT E/M  Dx

 

 [facility] [place]  [address]  D70.9



   9:07a  Assleticia hurst M.D.    









 R50.81

 

 C25.9









 Office Visit  2016  7:37a  [facility] [place],  
[address]  J18.1



     Brandy CAMPA    









 D61.810

 

 C25.9









 Office Visit  2009  3:00p  DO Not Use  [place],  [address]  V72.31



     Deisy-Nilesh CAMPA    







Plan of Care

Future Appointment(s):2018  2:45 pm - TIFFANY Nicholas at [facility]2018  3:45 pm - Darius Chavez M.D. at [facility]2018 - Darius Chavez M.D.C25.0 Malignant neoplasm of 
head of pancreas

## 2018-03-23 NOTE — OP
CC:  Dr. Darius Chavez; Dr. Kenrick Arellano

 

OPERATIVE REPORT:

 

DATE OF OPERATION:  03/22/18

 

DATE OF BIRTH:  [birth year]

 

SURGEON:  Darius Chavez MD

 

ASSISTANT:  Ying Rivers NP

 

ANESTHESIOLOGIST:  Darius Osorio MD

 

ANESTHESIA:  General anesthetic, local infiltration.

 

PRE-OP DIAGNOSIS:  Pancreatic carcinoma.

 

POST-OP DIAGNOSIS:  Pancreatic carcinoma.

 

OPERATIVE PROCEDURE:  Laparoscopy with biopsies.

 

DESCRIPTION OF PROCEDURE:  The patient was supine on the operating room table. After adequate general
 anesthetic, compression stockings, Rebekah Hugger warmer, and intravenous antibiotics, the abdomen was 
prepped with antiseptic, draped in a sterile fashion.  Local infiltrative anesthesia was administered
 and a small umbilical incision was created.  Blunt port cannula was placed.  Insufflation was kenia
d out with carbon dioxide.  Additional cannulae, 5-mm left lower quadrant and left mid abdomen, were 
placed through small stab wounds under direct vision. Inspection of the peritoneal cavity revealed in
numerable white patches that appeared thin and fibrotic.  There were also some white patches on the o
mentum and on the small bowel mesentery.  The bowel was run from beginning to end and there were no i
mplants on the bowel wall, no obvious liver metastasis.  No obvious metastases in the areas of the co
chintan that were visible.  An area of these white patches in the right lower abdominal peritoneum was bi
opsied and then a portion of omentum was biopsied as well.  These were sent to Pathology and neither 
of them showed any evidence of tumor.  So, it was felt that additional biopsy would be taken from the
 pancreas.  The gastrocolic omentum was entered and the lesser sac entered and the pancreatic mass wa
s evident and biopsies were taken from this site using small biopsy forceps.  These were sent fresh t
o Pathology and they confirmed adequate biopsy of the pancreatic tissue.  The site was suctioned.  Th
ere was no hemorrhage.  Cannulae were removed and the umbilical fascia was closed with 0 Vicryl follo
wed by 5-0 Vicryl for the skin, followed by Steri-Strips.  She tolerated the procedure well, was awak
e, and brought to Recovery in good condition. There were no complications, no drains.  Pathologic spe
cimens as above.  Sponge, instrument counts were correct.  Estimated blood loss 20 mL.

 

 [id number]/[facility] #: [id number]

## 2018-03-23 NOTE — ED
Ki CAPONE Tiffany, scribed for Gene Holman on 03/23/18 at 1748 .





Abdominal Pain/Female





- HPI Summary


HPI Summary: 


The patient is a 72 y/o F BIBA to [facility] with a chief complaint of abdominal 

pain since 03:00 today. The patient rates the pain 5/10 in severity. Symptoms 

aggravated by nothing. Symptoms alleviated by prescribed pain medication. 

Reports right shoulder pain, constipation and inability to urinate. Denies 

nausea, vomiting and fever. Diagnosed with pancreatic cancer in May 2016. Is 

currently on chemotherapy, no radiation. Recently had chemotherapy on 03/12/18. 

Had second biopsy yesterday for genetic analysis and was discharged home at 18:

00 yesterday. Woke up with abdominal pain at 03:00 this morning that was 

relieved with Dilaudid. At 15:00 today, abdominal pain returned so patient 

spoke with Dr. Arellano's (oncology) nurse who approved more pain medication and 

recommended going to the ED.





- History of Current Complaint


Chief Complaint: EDAbdPain


Stated Complaint: ABD PAIN


Time Seen by Provider: 03/23/18 17:33


Hx Obtained From: Patient


Onset/Duration: Sudden Onset, Lasting Hours - Since 03:00 today, Still Present


Timing: Constant


Severity Currently: Moderate


Pain Intensity: 5


Pain Scale Used: 0-10 Numeric


Location: Diffuse


Aggravating Factor(s): Nothing


Alleviating Factor(s): Medications


Associated Signs and Symptoms: Positive: Other: - right shoulder pain, 

constipation and inability to urinate.  Negative: Fever, Nausea, Vomiting


Allergies/Adverse Reactions: 


 Allergies











Allergy/AdvReac Type Severity Reaction Status Date / Time


 


No Known Allergies Allergy   Verified 03/22/18 12:42











Home Medications: 


 Home Medications





Bisacodyl EC TAB* [Dulcolax EC TAB*] 1 tab PO DAILY 03/23/18 [History Confirmed 

03/23/18]


HYDROmorphone TAB* [Dilaudid Tab*] 1 tab PO SEE INSTRUCTIONS PRN 03/23/18 [

History Confirmed 03/23/18]


Polyethylene Glycol 3350 [Miralax] 1 packet PO DAILY PRN 03/23/18 [History 

Confirmed 03/23/18]


Sennosides/Docusate Sodium [Senokot S] 1 tab PO BEDTIME PRN 03/23/18 [History 

Confirmed 03/23/18]











PMH/Surg Hx/FS Hx/Imm Hx


Previously Healthy: No


Endocrine/Hematology History: 


   Denies: Hx Diabetes, Hx Anemia - low platelets r/t chemo occassionally


Cardiovascular History: 


   Denies: Hx Hypertension


GI History: Reports: Other GI Disorders - pancreatic cancer


 History: 


   Denies: Hx Dialysis, Hx Renal Disease


Sensory History: Reports: Hx Contacts or Glasses - glasses to drive


   Denies: Hx Hearing Aid


Opthamlomology History: Reports: Hx Contacts or Glasses - glasses to drive


Neurological History: Reports: Hx Nerve Disease - neuropathy hands/feet from 

chemo





- Cancer History


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


Hx Chemotherapy: Yes





- Surgical History


Surgery Procedure, Year, and Place: Right ovary removed and stent inserted 6/ 2016.  pancreatic biopsy.  tonsillectomy when 7 years old.  powerport insertion


Hx Anesthesia Reactions: No


Infectious Disease History: No


Infectious Disease History: 


   Denies: Traveled Outside the [place] in Last 30 Days





- Family History


Known Family History: Positive: Diabetes - II


   Negative: Cardiac Disease





- Social History


Alcohol Use: None


Hx Substance Use: No


Substance Use Type: Reports: None


Hx Tobacco Use: No


Smoking Status (MU): Never Smoked Tobacco





Review of Systems


Negative: Fever


Positive: Abdominal Pain, Other - Constipation.  Negative: Vomiting, Nausea


Positive: other - Inability to urinate


Positive: Other - Right shoulder pain


All Other Systems Reviewed And Are Negative: Yes





Physical Exam





- Summary


Physical Exam Summary: 


Appearance: Well appearing, no pain distress


Skin: warm, dry, reflects adequate perfusion


Head/face: normal


Eyes: EOMI, ROBERT


ENT: normal


Neck: supple, non-tender


Respiratory: CTA, breath sounds present


Cardiovascular: tachycardic 


Abdomen: diffuse tenderness in abdomen, mild distention, dressing over abdomen


Bowel: present


Musculoskeletal: normal, strength/ROM intact


Neuro: normal, sensory motor intact, A&Ox3


Triage Information Reviewed: Yes


Vital Signs On Initial Exam: 


 Initial Vitals











Temp Pulse Resp BP Pulse Ox


 


 99.0 F   89   13   143/83   96 


 


 03/23/18 17:30  03/23/18 17:30  03/23/18 17:30  03/23/18 17:30  03/23/18 17:30











Vital Signs Reviewed: Yes





Diagnostics





- Vital Signs


 Vital Signs











  Temp Pulse Resp BP Pulse Ox


 


 03/23/18 17:35   86  16   96


 


 03/23/18 17:30  99.0 F  89  13  143/83  96














- Laboratory


Lab Results: 


 Lab Results











  03/23/18 03/23/18 03/23/18 Range/Units





  18:04 18:04 18:04 


 


WBC  25.6 H    (3.5-10.8)  10^3/ul


 


RBC  3.10 L    (4.0-5.4)  10^6/ul


 


Hgb  9.6 L    (12.0-16.0)  g/dl


 


Hct  29 L    (35-47)  %


 


MCV  92    (80-97)  fL


 


MCH  31    (27-31)  pg


 


MCHC  34    (31-36)  g/dl


 


RDW  16 H    (10.5-15)  %


 


Plt Count  310    (150-450)  10^3/ul


 


MPV  7.2 L    (7.4-10.4)  um3


 


Neut % (Auto)  90.2 H    (38-83)  %


 


Lymph % (Auto)  5.6 L    (25-47)  %


 


Mono % (Auto)  3.8    (0-7)  %


 


Eos % (Auto)  0.1    (0-6)  %


 


Baso % (Auto)  0.3    (0-2)  %


 


Absolute Neuts (auto)  23.1 H    (1.5-7.7)  10^3/ul


 


Absolute Lymphs (auto)  1.4    (1.0-4.8)  10^3/ul


 


Absolute Monos (auto)  1.0 H    (0-0.8)  10^3/ul


 


Absolute Eos (auto)  0    (0-0.6)  10^3/ul


 


Absolute Basos (auto)  0.1    (0-0.2)  10^3/ul


 


Absolute Nucleated RBC  0    10^3/ul


 


Nucleated RBC %  0.1    


 


Sodium   137   (133-145)  mmol/L


 


Potassium   3.7   (3.5-5.0)  mmol/L


 


Chloride   102   (101-111)  mmol/L


 


Carbon Dioxide   27   (22-32)  mmol/L


 


Anion Gap   8   (2-11)  mmol/L


 


BUN   15   (6-24)  mg/dL


 


Creatinine   0.90   (0.51-0.95)  mg/dL


 


Est GFR ( Amer)   79.4   (>60)  


 


Est GFR (Non-Af Amer)   61.7   (>60)  


 


BUN/Creatinine Ratio   16.7   (8-20)  


 


Glucose   180 H   ()  mg/dL


 


Lactic Acid    1.3  (0.5-2.0)  mmol/L


 


Calcium   9.2   (8.6-10.3)  mg/dL


 


Total Bilirubin   0.60   (0.2-1.0)  mg/dL


 


AST   22   (13-39)  U/L


 


ALT   21   (7-52)  U/L


 


Alkaline Phosphatase   278 H   ()  U/L


 


Troponin I   0.01   (<0.04)  ng/mL


 


Total Protein   5.8 L   (6.4-8.9)  g/dL


 


Albumin   3.4   (3.2-5.2)  g/dL


 


Globulin   2.4   (2-4)  g/dL


 


Albumin/Globulin Ratio   1.4   (1-3)  


 


Lipase   10 L   (11.0-82.0)  U/L











Result Diagrams: 


 03/23/18 18:04





 03/23/18 18:04


Lab Statement: Any lab studies that have been ordered have been reviewed, and 

results considered in the medical decision making process.





Abdominal Pain Fem Course/Dx





- Course


Course Of Treatment: 72 y/o F c/o abdominal pain s/p biopsy yesterday. Dr. Chavez (surgery) consulted on the case and agrees to admit patient. Patient 

agreeable to plan.





- Diagnoses


Differential Diagnosis: Positive: Bowel Obstruction, Diverticulitis, Urinary 

Tract Infection, Other - bowel perforation


Provider Diagnoses: 


 Acute abdomen, Pancreatic cancer








- Provider Notifications


Discussed Care Of Patient With: Darius Chavez


Time Discussed With Above Provider: 18:07


Instructed by Provider To: Other - Dr. Chavez (surgery) advised to order labs 

and agreed to see the patient.





Discharge





- Sign-Out/Discharge


Documenting (check all that apply): Discharge





- Discharge Plan


Condition: Fair


Disposition: ADMITTED TO [facility]


Referrals: 


No Primary Care Phys,NOPCP [Primary Care Provider] - 





- Billing Disposition and Condition


Condition: FAIR


Disposition: [facility]





The documentation as recorded by the Ki mai Tiffany accurately reflects 

the service I personally performed and the decisions made by [place] f[place] Emmanuel.

## 2018-03-24 LAB
BASOPHILS # BLD AUTO: 0 10^3/UL (ref 0–0.2)
EOSINOPHIL # BLD AUTO: 0 10^3/UL (ref 0–0.6)
HCT VFR BLD AUTO: 26 % (ref 35–47)
HGB BLD-MCNC: 8.7 G/DL (ref 12–16)
LYMPHOCYTES # BLD AUTO: 0.7 10^3/UL (ref 1–4.8)
MCH RBC QN AUTO: 31 PG (ref 27–31)
MCHC RBC AUTO-ENTMCNC: 34 G/DL (ref 31–36)
MCV RBC AUTO: 92 FL (ref 80–97)
MONOCYTES # BLD AUTO: 0.3 10^3/UL (ref 0–0.8)
NEUTROPHILS # BLD AUTO: 19.8 10^3/UL (ref 1.5–7.7)
NRBC # BLD AUTO: 0 10^3/UL
NRBC BLD QL AUTO: 0
PLATELET # BLD AUTO: 272 10^3/UL (ref 150–450)
RBC # BLD AUTO: 2.81 10^6/UL (ref 4–5.4)
WBC # BLD AUTO: 20.9 10^3/UL (ref 3.5–10.8)

## 2018-03-24 RX ADMIN — HEPARIN SCH: 100 SYRINGE at 02:09

## 2018-03-24 RX ADMIN — SPIRONOLACTONE PRN MG: 25 TABLET ORAL at 12:36

## 2018-03-24 RX ADMIN — DOCUSATE SODIUM SCH MG: 100 CAPSULE, LIQUID FILLED ORAL at 08:48

## 2018-03-24 RX ADMIN — HEPARIN SCH: 100 SYRINGE at 08:49

## 2018-03-24 RX ADMIN — OXYCODONE HYDROCHLORIDE AND ACETAMINOPHEN PRN TAB: 5; 325 TABLET ORAL at 11:36

## 2018-03-24 RX ADMIN — OXYCODONE HYDROCHLORIDE AND ACETAMINOPHEN PRN TAB: 5; 325 TABLET ORAL at 21:15

## 2018-03-24 NOTE — OP
CC:  Dr. Chavez; Dr. Kenrick Arellano

 

OPERATIVE REPORT:

 

DATE OF OPERATION:  03/23/18

 

DATE OF BIRTH:  [birth year]

 

SURGEON:  Darius Chavez MD

 

ASSISTANT:  Dr. Agudelo.

 

ANESTHESIOLOGIST:  Dr. Eagle.

 

ANESTHESIA:  General anesthetic, local infiltration.

 

PRE-OP DIAGNOSIS:  Abdominal pain.

 

POST-OP DIAGNOSIS:  Abdominal pain secondary to bladder perforation.

 

OPERATIVE PROCEDURE:  Diagnostic laparoscopy with repair of bladder perforation.

 

DESCRIPTION OF PROCEDURE:  The patient was supine on the operative table.  
After adequate general anesthetic, compression stockings, Rebekah Hugger warmer, 
and intravenous antibiotics, the abdomen was prepped with antiseptic, draped in 
a sterile fashion.  Local infiltrative anesthesia was administered and the 
umbilical incision was reentered and insufflation was carried out with carbon 
oxide. Additional cannulae were administered through the previous sites in the 
left lower quadrant and left mid abdomen.  Ultimately, a right upper quadrant 5-
mm cannula was placed as well.  Inspection of the small bowel was normal.  The 
omental biopsy site was as expected.  The pancreatic biopsy site as expected.  
However, at the site of the peritoneal biopsy down in the pelvis, a bladder 
injury was identified, it appeared to be at site of some cauterization and free 
fluid was suctioned and irrigated from the area and the bladder injury was 
closed with generous full- thickness bites of running 2-0 Vicryl.  This created 
a nice snug closure.  Boy-Vega drain was then placed down into the pelvis 
and brought out through the left lower quadrant stab wound, sutured to the skin 
with 3-0 Prolene.  The operative field was irrigated again and suctioned out 
and the pneumoperitoneum was allowed to escape. The umbilical fascia was closed 
with 0 Vicryl and skin with 4-0 Biosyn in all cases, followed by Steri-Strips.  
A Zendejas catheter was placed.  She was brought to Recovery in good condition.  
Drain is Boy-Vega.  Sponge and instrument counts correct.  Estimated blood 
loss is 30 mL.

 

 [id number]/CPS #: [id number]

[facility]

## 2018-03-25 LAB
BASOPHILS # BLD AUTO: 0 10^3/UL (ref 0–0.2)
EOSINOPHIL # BLD AUTO: 0 10^3/UL (ref 0–0.6)
HCT VFR BLD AUTO: 23 % (ref 35–47)
HGB BLD-MCNC: 7.6 G/DL (ref 12–16)
LYMPHOCYTES # BLD AUTO: 2.1 10^3/UL (ref 1–4.8)
MCH RBC QN AUTO: 31 PG (ref 27–31)
MCHC RBC AUTO-ENTMCNC: 33 G/DL (ref 31–36)
MCV RBC AUTO: 94 FL (ref 80–97)
MONOCYTES # BLD AUTO: 0.8 10^3/UL (ref 0–0.8)
NEUTROPHILS # BLD AUTO: 8 10^3/UL (ref 1.5–7.7)
NRBC # BLD AUTO: 0 10^3/UL
NRBC BLD QL AUTO: 0.1
PLATELET # BLD AUTO: 257 10^3/UL (ref 150–450)
RBC # BLD AUTO: 2.45 10^6/UL (ref 4–5.4)
WBC # BLD AUTO: 10.9 10^3/UL (ref 3.5–10.8)

## 2018-03-25 RX ADMIN — HEPARIN SCH ML: 100 SYRINGE at 05:14

## 2018-03-25 RX ADMIN — OXYCODONE HYDROCHLORIDE AND ACETAMINOPHEN PRN TAB: 5; 325 TABLET ORAL at 13:43

## 2018-03-25 RX ADMIN — SPIRONOLACTONE PRN MG: 25 TABLET ORAL at 08:31

## 2018-03-25 RX ADMIN — DOCUSATE SODIUM SCH MG: 100 CAPSULE, LIQUID FILLED ORAL at 08:30

## 2018-03-25 RX ADMIN — HEPARIN SCH: 100 SYRINGE at 07:41

## 2018-03-25 NOTE — PN
Progress Note





- Progress Note


Date of Service: 03/25/18


SOAP: 


Subjective:





Doing much better-still with some pain but she wants to avoid narcotics.


Passing a small amount of flatus


Not taking a large amount of PO








Objective:


 











Temp Pulse Resp BP Pulse Ox


 


 97.9 F   80   16   115/60   96 


 


 03/25/18 07:47  03/25/18 07:47  03/25/18 08:00  03/25/18 07:47  03/25/18 07:47








 Intake & Output











 03/23/18 03/24/18 03/25/18 03/26/18





 06:59 06:59 06:59 06:59


 


Intake Total  1600 610 


 


Output Total  465 1455 


 


Balance  1135 -845 


 


Weight  143 lb  


 


Intake:    


 


  IV Fluids  1550  


 


    LR  1200  


 


    NS 100ML, Zosyn 3.375G  100  


 


  Oral  50 610 


 


Output:    


 


  MIMI #1  30 155 


 


  Abdi  405 1300 


 


  Estimated Blood Loss  30  





 





PEX:


Comfortable


Lungs are clear


Abd is soft and slightly distended. Dressing intact, incisions are clean and 

dry.


MIMI with reddish serosanguinous fluid in bulb





Abdi catheter with clear yellow urine in bag.





 Laboratory Results - last 24 hr











  03/25/18 03/25/18





  05:12 05:12


 


WBC   10.9 H


 


RBC   2.45 L


 


Hgb   7.6 L


 


Hct   23 L


 


MCV   94


 


MCH   31


 


MCHC   33


 


RDW   16 H


 


Plt Count   257


 


MPV   6.9 L


 


Neut % (Auto)   73.1


 


Lymph % (Auto)   19.5 L


 


Mono % (Auto)   7.0


 


Eos % (Auto)   0.3


 


Baso % (Auto)   0.1


 


Absolute Neuts (auto)   8.0 H


 


Absolute Lymphs (auto)   2.1


 


Absolute Monos (auto)   0.8


 


Absolute Eos (auto)   0


 


Absolute Basos (auto)   0


 


Absolute Nucleated RBC   0


 


Nucleated RBC %   0.1


 


Sodium  137 


 


Potassium  4.0 


 


Chloride  104 


 


Carbon Dioxide  30 


 


Anion Gap  3 


 


BUN  13 


 


Creatinine  0.73 


 


Est GFR ( Amer)  101.1 


 


Est GFR (Non-Af Amer)  78.6 


 


BUN/Creatinine Ratio  17.8 


 


Glucose  148 H 


 


Calcium  8.5 L 























Assessment:


POS # 2 s/p laparoscopic repair of bladder injury after diagnostic laparoscopy 

on 3/23








Plan:


She does not feel ready for discharge today-will observe and increase po and 

activity


MIMI still with significant output-will leave in


Continue abdi drainage


All discussed with patient and .

## 2018-03-26 VITALS — SYSTOLIC BLOOD PRESSURE: 121 MMHG | DIASTOLIC BLOOD PRESSURE: 63 MMHG

## 2018-03-26 LAB
HCT VFR BLD AUTO: 23 % (ref 35–47)
HGB BLD-MCNC: 7.7 G/DL (ref 12–16)
MCH RBC QN AUTO: 31 PG (ref 27–31)
MCHC RBC AUTO-ENTMCNC: 33 G/DL (ref 31–36)
MCV RBC AUTO: 95 FL (ref 80–97)
PLATELET # BLD AUTO: 268 10^3/UL (ref 150–450)
RBC # BLD AUTO: 2.46 10^6/UL (ref 4–5.4)
WBC # BLD AUTO: 9.5 10^3/UL (ref 3.5–10.8)

## 2018-03-26 RX ADMIN — DOCUSATE SODIUM SCH MG: 100 CAPSULE, LIQUID FILLED ORAL at 08:42

## 2018-03-26 RX ADMIN — OXYCODONE HYDROCHLORIDE AND ACETAMINOPHEN PRN TAB: 5; 325 TABLET ORAL at 01:33

## 2018-03-26 RX ADMIN — HEPARIN SCH ML: 100 SYRINGE at 08:42

## 2018-03-26 RX ADMIN — HEPARIN SCH ML: 100 SYRINGE at 05:38

## 2018-03-26 NOTE — DS
CC:  Dr. Chavez; Kenrick Arellano MD *

 

DISCHARGE SUMMARY:

 

DATE OF ADMISSION:  03/23/18

 

DATE OF DISCHARGE:  03/26/18

 

PRINCIPAL ADMITTING DIAGNOSES:  Bladder injury, status post laparoscopic biopsy.

 

OPERATION ON THIS ADMISSION:  Laparoscopy and repair of bladder injury.

 

SURGEON:  Dr. Chavez.

 

COMPLICATIONS ON THIS ADMISSION:  None.

 

HOSPITAL COURSE:  The patient came to the hospital after having undergone a 
laparoscopic biopsy of pancreatic carcinoma.  She had elevated white blood 
count and intense abdominal pain and was taken to the operating room for a 
diagnostic laparoscopy that was done immediately that evening and she was found 
to have bladder injury at the site of a pelvic biopsy.  This was repaired 
laparoscopically using sutures and a MIMI drain was left in the abdomen and a 
Zendejas catheter in the bladder.  She had a relatively uneventful postoperative 
recovery with gradual improvement in pain control and appetite and she was able 
to ambulate and was tolerating oral intake and passing flatus and was 
discharged home with instructions with the drains in place.  She will follow up 
in the office, has an appointment on 03/28/18 and I have also discussed the 
case with Dr. Allen who will be coordinating her oncology care.

 

 [id number]/CPS #: [id number]

ALBERTO

## 2018-03-31 ENCOUNTER — HOSPITAL ENCOUNTER (EMERGENCY)
Dept: HOSPITAL 25 - ED | Age: 73
Discharge: HOME | End: 2018-03-31
Payer: MEDICARE

## 2018-03-31 VITALS — DIASTOLIC BLOOD PRESSURE: 69 MMHG | SYSTOLIC BLOOD PRESSURE: 126 MMHG

## 2018-03-31 DIAGNOSIS — E86.0: Primary | ICD-10-CM

## 2018-03-31 DIAGNOSIS — R03.0: ICD-10-CM

## 2018-03-31 PROCEDURE — 99282 EMERGENCY DEPT VISIT SF MDM: CPT

## 2018-03-31 NOTE — XMS REPORT
Regina Lennox

 Created on:2018



Patient:Lennox, Regina

Sex:Female

:1945

External Reference #:[id number]





Demographics







 Address  [address] [place] [address]

 

 Home Phone  [phone]

 

 Mobile Phone  [phone]

 

 Work Phone  [phone]

 

 Email Address  [email or web address]

 

 Preferred Language  English

 

 Marital Status  Not  Or 

 

 Yarsanism Affiliation  Unknown

 

 Race  White

 

 Ethnic Group  Not  Or 









Author







 Organization  [facility]

 

 Address  [address] [address]

 

 Phone  [phone]









Care Team Providers







 Name  Role  Phone

 

 Kenrick Arellano MD  Primary Care Physician  Unavailable









Payers







 Type  Date  Identification Numbers  Payment Provider  Subscriber

 

 Medicare Primary    Policy Number: [id number]  Medicare  Brigid Lennox









 PayID: [id number]  [address]









 [place] [address]









 [facility]    Policy Number: [id number]  [employer]  Regina Lennox









 PayID: [id number]  [address]









 [place] [address]







Problems







 Description

 

 No Information







Social History







 Type  Date  Description  Comments

 

 Smoking    Patient has never smoked  







Allergies, Adverse Reactions, Alerts







 Date  Description  Reaction  Status  Severity  Comments

 

 2018  NKDA    active    







Medications







 Medication  Date  Status  Form  Strength  Qnty  SIG  Indications  Ordering



                 Provider

 

 Aldactone    Active  Tablets  25mg    1 by mouth    Unknown



   /0000          every day as    



             needed    

 

 Bisacodyl Ec    Active  Tablets  5mg    take one    Unknown



   /0000    DR      tablet by    



             mouth once    



             daily for    



             constipation    



             as needed    

 

 Colace    Active  Capsules  100mg    1 tab by    Unknown



   /0000          mouth 2-3    



             times a day    



             as needed    

 

 Senna    Active  Tablets  8.6mg    2 tabs by    Unknown



   /0000          mouth 1-2    



             times daily    



             as needed    

 

 Magnesium    Active  Tablets  400mg    1 by mouth    Unknown



   /0000          twice a  day    

 

 Multivitamin    Active  Tablets      1 by mouth    Unknown



 Adult  /0000          every day    

 

 Miralax    Active  Packet  3350NF    once a day    Unknown



   /0000          in in the    



             morning as    



             needed for    



             constipation    

 

 Omeprazole    Active  Capsules  20mg    1 by mouth    Unknown



   /0000    DR      every day as    



             needed    

 

 Spironolactone/Hy    Active  Tablets  25-25mg    1 by mouth    Unknown



 drochlorothiazide  /          every day    

 

 Tylenol    Active  Capsules  325mg    2 tablets    Unknown



   /0000          every 4    



             hours as    



             needed for    



             pain    

 

 Benadryl Allergy    Active  Tablets  25mg    1/2 tab at    Unknown



   /0000          bedtime as    



             needed    

 

                 

 

 Imodium A-D    Hx  Capsules  2mg    as directed    Unknown



   /0000              

 

 Ondansetron    Hx  Tablets  4mg    dissolve one    Unknown



   /0000    Dispers      tablet    



             orally every    



             6 hours as    



             needed for    



             nausea.    

 

 Prochlorperazine    Hx  Tablets  5mg    1 by mouth    Unknown



 Maleate            every 8 h as    



             needed    



             nausea    

 

 Hydromorphone HCL    Hx  Tablets  4mg    take   1/2    Unknown



   /0000          To 1 Tablet    



             by mouth    



             every 4    



             hours if    



             needed    







Vital Signs







 Date  Vital  Result  Comment

 

 2018  Heart Rate  90 /min  









 BP Systolic Sitting  118 mmHg  

 

 BP Diastolic Sitting  76 mmHg  

 

 Respiratory Rate  18 /min  

 

 Body Temperature  96.9 F  









 2018  Height  62.5 inches  5'2.50"









 Weight  138.00 lb  

 

 Heart Rate  80 /min  

 

 BP Systolic  102 mmHg  

 

 BP Diastolic  64 mmHg  

 

 Respiratory Rate  16 /min  

 

 Body Temperature  97.5 F  

 

 BMI (Body Mass Index)  24.8 kg/m2  







Results







 Test  Date  Test  Result  H/L  Range  Note

 

 Laboratory test  2018  Surgical Pathology  SEE RESULT BELOW      1



 finding            









 1  SEE RESULT BELOW



   -----------------------------------------------------------------------------
---------------



   Name:  LENNOX,REGINA C                 : 1945    Attend Dr: Darius Chavez MD



   Acct:  [id number]  Unit: [id number]  AGE: 73            Location:  OR



   Re18                        SEX: F             Status:    EZEKIEL PHILLIP



   -----------------------------------------------------------------------------
---------------



   



   SPEC: [id number]             NICOLE:       SUBM DR: Darius Chavez MD



   REQ:  [id number]             RECD: 



   STATUS: SOUT



   _



   ORDERED:  FS 1ST PER SPEC/3, LEVEL 4/2, LEVEL 5



   



   FINAL DIAGNOSIS



   



   



   1.   Peritoneum, biopsy:



   -- Benign fibroadipose tissue with focal acellular mucin deposition.



   -- No evidence of viable malignant cells.



   



   2.   Omentum, biopsy:



   -- Benign fibroadipose tissue with no evidence of metastatic carcinoma.



   



   3.   Pancreas, biopsy:



   -- Invasive mucinous adenocarcinoma, well-differentiated.



   



   



   



   PATHOLOGY SURGICAL CONSULT



   



   Frozen section (FS)/Touch Prep (TP)/Gross Consult (GC)



   



   FS1) Peritoneal, biopsy:



   a. Fibroadipose tissue, skeletal muscle. (EP)



   b.   No viable tumor seen. (EP)



   



   Findings discussed with Dr Chavez at 1440 on 3/22/18.



   



   FS3) Pancreas, biopsy:



   Mucinous cystic neoplasm. (EP)



   



   Attempt made to discussed with Dr Chavez at 1502 on 3/22/18



   



   PRE-OPERATIVE DIAGNOSIS



   



   Malignant neoplasm of head of pancreas.



   



   



   



   



   ** CONTINUED ON NEXT PAGE **



   



   DEPARTMENT OF PATHOLOGY,  [address] [place] [address]



   Phone # [phone]      Fax #[phone]



   Mika Tinoco M.D. Director     [facility] # [id number]



   



   



   



   RUN DATE: 18               [facility] **LIVE**         
       PAGE    2



   



   -----------------------------------------------------------------------------
---------------



   Patient: LENNOX,REGINA PAWAN                  [id number]     (Continued)



   -----------------------------------------------------------------------------
---------------



   



   GROSS DESCRIPTION          (Continued)



   



   



   GROSS DESCRIPTION



   



   1.   The specimen is received fresh labeled, Peritoneal Biopsy, and consists 
of a 3.2 x 1.9



   by up to 1.0 cm yellow pink irregular adipose tissue fragment admixed with 
scant tan-pink



   fibrous tissue.  A representative section is submitted for frozen section 
microscopy.  The



   frozen section residue and the remaining specimen are entirely submitted in 
cassettes FSA



   and B and C.



   



   2.   The specimen is received fresh labeled, Omental Biopsy, and consists of 
a 5.2 x 2.1 x



   0.6 cm yellow pink irregular adipose tissue fragments admixed with scant tan-
pink fibrous



   tissue.  A representative section is submitted for frozen section 
microscopy.  The frozen



   section residue and the remaining specimen are entirely submitted in 
cassettes FSA and B and



   C.



   



   3.   The specimen is received fresh labeled, Pancreas Biopsy, and consists 
of a 2.7 x 2.0 x



   0.4 cm aggregate of tan-pink irregular soft tissue fragments admixed with 
blood-tinged



   mucus.  A representative section is submitted for frozen section microscopy.
  The frozen



   section residue and the remaining specimen are entirely submitted in 
cassettes FSA and B.



   



   Signed __________(signature on file)___________ Anita Mayo MD  [id number]



   



   -----------------------------------------------------------------------------
---------------



   



   



   



   



   



   



   



   



   



   



   



   



   



   



   



   



   



   



   



   



   ** END OF REPORT **



   



   DEPARTMENT OF PATHOLOGY,  [address] [place] [address]



   Phone # [phone]      Fax #[phone]



   Mika Tinoco M.D. Director     [facility] # [id number]







Procedures







 Date  CPT Code  Description  Status

 

 2018  90094  Laparoscopy/Peritoneoscopy With Biopsy (Single Or  Completed



     Multiple)  

 

 2018  72329  Laparoscopy/Peritoneoscopy With Biopsy (Single Or  Completed



     Multiple)  

 

 10/20/2009  23910  Biopsy Cervix, Single Or Multiple, Or Local Excision Of  
Completed



     Lesion  

 

 2009  65055  EKG Tracing &amp; Interpretation  Completed







Encounters







 Type  Date  Location  Provider  CPT E/M  Dx

 

 Office Visit  2018  [facility]  Darius Chavez M.D.  [address]
  C25.0



   10:00a  [facility]      

 

 Office Visit  2018  [facility],  [address]  D70.9



   9:07a  [facility]    









 R50.81

 

 C25.9









 Office Visit  2016  7:37a  [facility]  Berna Eastman,  
[address]  J18.1



     [facility]    









 D61.810

 

 C25.9









 Office Visit  2009  3:00p  DO Not Use  Flavia Black,  [address]  V72.31



     [facility]    







Plan of Care

Future Appointment(s):2018 10:00 am - Darius Chavez M.D. at [facility]2018 - Darius Chavez M.D.C25.0 Malignant neoplasm of 
head of pancreasFollow up:monday

## 2018-03-31 NOTE — ED
Ángel CAPONE Natalie, scribed for Tim Williamson MD on 03/31/18 at 1552 .





GI/ HPI





- HPI Summary


HPI Summary: 


The pt is a 72 y/o F presenting to the ED c/o catheter not working starting 

three hours ago. She had noticed that there wasn't any urine being collected in 

the bag, there was sediment in the tubing, and it seems that there is urine 

backed up in the beginning of the tubing. When she was sitting in triage, there 

was urine flow to the bag. She states that she hasn't drank a lot today, and 

she doesn't feel like she has to urinate. She denies any pain associated with 

the catheter. 





- History of Current Complaint


Chief Complaint: EDUrogenitalProblems


Time Seen by Provider: 03/31/18 15:23


Stated Complaint: CHECK CATHER


Hx Obtained From: Patient


Onset/Duration: Started Hours Ago, Still Present


Timing: Constant


Severity: Mild


Current Severity: None


Pain Intensity: 0


Associated Signs and Symptoms: Positive: Other: - decreased fluid intake, no 

associated pain





- Additional Pertinent History


Primary Care Physician: [id number]





- Allergy/Home Medications


Allergies/Adverse Reactions: 


 Allergies











Allergy/AdvReac Type Severity Reaction Status Date / Time


 


No Known Allergies Allergy   Verified 03/31/18 15:09














PMH/Surg Hx/FS Hx/Imm Hx


Endocrine/Hematology History: 


   Denies: Hx Diabetes, Hx Anemia - low platelets r/t chemo occassionally


Cardiovascular History: 


   Denies: Hx Hypertension


GI History: Reports: Other GI Disorders - pancreatic cancer


 History: 


   Denies: Hx Dialysis, Hx Renal Disease


Musculoskeletal History: Reports: Hx Arthritis - fingers and toes


Sensory History: Reports: Hx Contacts or Glasses - glasses to drive


   Denies: Hx Hearing Aid


Opthamlomology History: Reports: Hx Contacts or Glasses - glasses to drive


Neurological History: Reports: Hx Nerve Disease - neuropathy hands/feet from 

chemo





- Cancer History


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


Hx Chemotherapy: Yes





- Surgical History


Surgery Procedure, Year, and Place: Right ovary removed and stent inserted 6/ 2016.  pancreatic biopsy.  tonsillectomy when 7 years old.  powerport insertion


Hx Anesthesia Reactions: No


Infectious Disease History: No


Infectious Disease History: 


   Denies: Traveled Outside the [place] in Last 30 Days





- Family History


Known Family History: Positive: Diabetes - II


   Negative: Cardiac Disease





- Social History


Alcohol Use: None


Hx Substance Use: No


Substance Use Type: Reports: None


Hx Tobacco Use: No


Smoking Status (MU): Never Smoked Tobacco





Review of Systems


Genitourinary: Other - catheter not working


Neurological: Other - decrease in fluid intake


All Other Systems Reviewed And Are Negative: Yes





Physical Exam


Triage Information Reviewed: Yes


Vital Signs On Initial Exam: 


 Initial Vitals











Temp Pulse Resp BP Pulse Ox


 


 97.9 F   95   14   136/81   97 


 


 03/31/18 15:10  03/31/18 15:10  03/31/18 15:10  03/31/18 15:10  03/31/18 15:10











Vital Signs Reviewed: Yes


Appearance: Positive: Well-Appearing, No Pain Distress


Skin: Positive: Warm, Skin Color Reflects Adequate Perfusion, Dry


Head/Face: Positive: Normal Head/Face Inspection


Eyes: Positive: EOMI, ROBERT


ENT: Positive: Normal ENT inspection


Neck: Positive: Supple, Nontender


Respiratory/Lung Sounds: Positive: Clear to Auscultation, Breath Sounds Present


Cardiovascular: Positive: RRR


Abdomen Description: Positive: Nontender, Soft


Bowel Sounds: Positive: Present


Musculoskeletal: Positive: Normal, Strength/ROM Intact


Neurological: Positive: Normal, Sensory/Motor Intact, Alert, Oriented to Person 

Place, Time


Psychiatric: Positive: Affect/Mood Appropriate





Diagnostics





- Vital Signs


 Vital Signs











  Temp Pulse Resp BP Pulse Ox


 


 03/31/18 15:10  97.9 F  95  14  136/81  97














- Laboratory


Lab Statement: Any lab studies that have been ordered have been reviewed, and 

results considered in the medical decision making process.





GIGU Course/Dx





- Course


Course Of Treatment: Medications reviewed. BP noted and advised to follow up 

with PCP.  BLADDER SCAN SHOWED NO URINE INITIALLY.  AFTER THE PATIENT DRANK 

WATER, THE PATIENT STARTED URINATING.





- Diagnoses


Provider Diagnoses: 


 Elevated BP without diagnosis of hypertension, Dehydration








Discharge





- Sign-Out/Discharge


Documenting (check all that apply): Discharge





- Discharge Plan


Condition: Stable


Disposition: HOME


Discharge Disposition Comment: The pt will be discharged home under stable 

conditions.


Patient Education Materials:  Dehydration (ED), [facility]endejas Catheter Placement and 

Care (ED)


Referrals: 


No Primary Care Phys,NOPCP [Primary Care Provider] - 


Additional Instructions: 


FOLLOW UP WITH YOUR DOCTOR.


RETURN TO THE EMERGENCY DEPARTMENT FOR ANY WORSENING OF YOUR CONDITION OR 

QUESTIONS OR CONCERNS.


YOUR BLOOD PRESSURE WAS ELEVATED TODAY; FOLLOW UP WITH YOUR PRIMARY CARE DOCTOR 

WITHIN ONE WEEK.





- Billing Disposition and Condition


Condition: STABLE


Disposition: HOME





The documentation as recorded by the Ángel mai Natalie accurately reflects 

the service I personally performed and the decisions made by me, Tim Williamson MD.